# Patient Record
Sex: MALE | Race: BLACK OR AFRICAN AMERICAN | NOT HISPANIC OR LATINO | Employment: STUDENT | URBAN - METROPOLITAN AREA
[De-identification: names, ages, dates, MRNs, and addresses within clinical notes are randomized per-mention and may not be internally consistent; named-entity substitution may affect disease eponyms.]

---
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
## 2020-08-14 DIAGNOSIS — Z11.59 NEED FOR HEPATITIS C SCREENING TEST: ICD-10-CM

## 2020-10-05 ENCOUNTER — PATIENT MESSAGE (OUTPATIENT)
Dept: ADMINISTRATIVE | Facility: HOSPITAL | Age: 20
End: 2020-10-05

## 2020-11-13 ENCOUNTER — LAB VISIT (OUTPATIENT)
Dept: LAB | Facility: HOSPITAL | Age: 20
End: 2020-11-13
Payer: COMMERCIAL

## 2020-11-13 ENCOUNTER — OFFICE VISIT (OUTPATIENT)
Dept: FAMILY MEDICINE | Facility: CLINIC | Age: 20
End: 2020-11-13
Payer: COMMERCIAL

## 2020-11-13 VITALS
WEIGHT: 185.94 LBS | HEART RATE: 81 BPM | HEIGHT: 78 IN | OXYGEN SATURATION: 98 % | SYSTOLIC BLOOD PRESSURE: 120 MMHG | TEMPERATURE: 97 F | DIASTOLIC BLOOD PRESSURE: 77 MMHG | BODY MASS INDEX: 21.51 KG/M2

## 2020-11-13 DIAGNOSIS — R30.0 DYSURIA: ICD-10-CM

## 2020-11-13 DIAGNOSIS — N39.0 URINARY TRACT INFECTION WITH HEMATURIA, SITE UNSPECIFIED: Primary | ICD-10-CM

## 2020-11-13 DIAGNOSIS — R31.9 URINARY TRACT INFECTION WITH HEMATURIA, SITE UNSPECIFIED: Primary | ICD-10-CM

## 2020-11-13 LAB
BILIRUB SERPL-MCNC: NEGATIVE MG/DL
BLOOD URINE, POC: ABNORMAL
CLARITY, POC UA: ABNORMAL
COLOR, POC UA: YELLOW
GLUCOSE UR QL STRIP: NORMAL
KETONES UR QL STRIP: NEGATIVE
LEUKOCYTE ESTERASE URINE, POC: ABNORMAL
NITRITE, POC UA: NEGATIVE
PH, POC UA: 5
PROTEIN, POC: ABNORMAL
UROBILINOGEN, POC UA: NORMAL

## 2020-11-13 PROCEDURE — 86592 SYPHILIS TEST NON-TREP QUAL: CPT

## 2020-11-13 PROCEDURE — 81002 POCT URINE DIPSTICK WITHOUT MICROSCOPE: ICD-10-PCS | Mod: S$GLB,,, | Performed by: NURSE PRACTITIONER

## 2020-11-13 PROCEDURE — 36415 COLL VENOUS BLD VENIPUNCTURE: CPT | Mod: PO

## 2020-11-13 PROCEDURE — 86703 HIV-1/HIV-2 1 RESULT ANTBDY: CPT

## 2020-11-13 PROCEDURE — 99999 PR PBB SHADOW E&M-EST. PATIENT-LVL III: ICD-10-PCS | Mod: PBBFAC,,, | Performed by: NURSE PRACTITIONER

## 2020-11-13 PROCEDURE — 99214 OFFICE O/P EST MOD 30 MIN: CPT | Mod: 25,S$GLB,, | Performed by: NURSE PRACTITIONER

## 2020-11-13 PROCEDURE — 3008F PR BODY MASS INDEX (BMI) DOCUMENTED: ICD-10-PCS | Mod: CPTII,S$GLB,, | Performed by: NURSE PRACTITIONER

## 2020-11-13 PROCEDURE — 96372 THER/PROPH/DIAG INJ SC/IM: CPT | Mod: S$GLB,,, | Performed by: NURSE PRACTITIONER

## 2020-11-13 PROCEDURE — 99214 PR OFFICE/OUTPT VISIT, EST, LEVL IV, 30-39 MIN: ICD-10-PCS | Mod: 25,S$GLB,, | Performed by: NURSE PRACTITIONER

## 2020-11-13 PROCEDURE — 96372 PR INJECTION,THERAP/PROPH/DIAG2ST, IM OR SUBCUT: ICD-10-PCS | Mod: S$GLB,,, | Performed by: NURSE PRACTITIONER

## 2020-11-13 PROCEDURE — 87086 URINE CULTURE/COLONY COUNT: CPT

## 2020-11-13 PROCEDURE — 1126F PR PAIN SEVERITY QUANTIFIED, NO PAIN PRESENT: ICD-10-PCS | Mod: S$GLB,,, | Performed by: NURSE PRACTITIONER

## 2020-11-13 PROCEDURE — 99999 PR PBB SHADOW E&M-EST. PATIENT-LVL III: CPT | Mod: PBBFAC,,, | Performed by: NURSE PRACTITIONER

## 2020-11-13 PROCEDURE — 87661 TRICHOMONAS VAGINALIS AMPLIF: CPT

## 2020-11-13 PROCEDURE — 81002 URINALYSIS NONAUTO W/O SCOPE: CPT | Mod: S$GLB,,, | Performed by: NURSE PRACTITIONER

## 2020-11-13 PROCEDURE — 1126F AMNT PAIN NOTED NONE PRSNT: CPT | Mod: S$GLB,,, | Performed by: NURSE PRACTITIONER

## 2020-11-13 PROCEDURE — 3008F BODY MASS INDEX DOCD: CPT | Mod: CPTII,S$GLB,, | Performed by: NURSE PRACTITIONER

## 2020-11-13 RX ORDER — AZITHROMYCIN 1 G/1
1 POWDER, FOR SUSPENSION ORAL ONCE
Qty: 1 PACKET | Refills: 0 | Status: SHIPPED | OUTPATIENT
Start: 2020-11-13 | End: 2020-11-13

## 2020-11-13 RX ORDER — CEFTRIAXONE 250 MG/1
250 INJECTION, POWDER, FOR SOLUTION INTRAMUSCULAR; INTRAVENOUS
Status: COMPLETED | OUTPATIENT
Start: 2020-11-13 | End: 2020-11-13

## 2020-11-13 RX ADMIN — CEFTRIAXONE 250 MG: 250 INJECTION, POWDER, FOR SOLUTION INTRAMUSCULAR; INTRAVENOUS at 11:11

## 2020-11-13 NOTE — PATIENT INSTRUCTIONS
Ceftriaxone injection  What is this medicine?  CEFTRIAXONE (sef try AX one) is a cephalosporin antibiotic. It is used to treat certain kinds of bacterial infections. It will not work for colds, flu, or other viral infections.  How should I use this medicine?  This medicine is injected into a muscle or infused it into a vein. It is usually given in a medical office or clinic. If you are to give this medicine you will be taught how to inject it. Follow instructions carefully. Use your doses at regular intervals. Do not take your medicine more often than directed. Do not skip doses or stop your medicine early even if you feel better. Do not stop taking except on your doctor's advice.  Talk to your pediatrician regarding the use of this medicine in children. Special care may be needed.  What side effects may I notice from receiving this medicine?  Side effects that you should report to your doctor or health care professional as soon as possible:  · allergic reactions like skin rash, itching or hives, swelling of the face, lips, or tongue  · breathing problems  · fever, chills  · irregular heartbeat  · pain when passing urine  · seizures  · stomach pain, cramps  · unusual bleeding, bruising  · unusually weak or tired  Side effects that usually do not require medical attention (report to your doctor or health care professional if they continue or are bothersome):  · diarrhea  · dizzy, drowsy  · headache  · nausea, vomiting  · pain, swelling, irritation where injected  · stomach upset  · sweating  What may interact with this medicine?  Do not take this medicine with any of the following medications:  · intravenous calcium  This medicine may also interact with the following medications:  · birth control pills  What if I miss a dose?  If you miss a dose, take it as soon as you can. If it is almost time for your next dose, take only that dose. Do not take double or extra doses.  Where should I keep my medicine?  Keep out of  the reach of children.  Store at room temperature below 25 degrees C (77 degrees F). Protect from light. Throw away any unused vials after the expiration date.  What should I tell my health care provider before I take this medicine?  They need to know if you have any of these conditions:  · any chronic illness  · bowel disease, like colitis  · both kidney and liver disease  · high bilirubin level in  patients  · an unusual or allergic reaction to ceftriaxone, other cephalosporin or penicillin antibiotics, foods, dyes, or preservatives  · pregnant or trying to get pregnant  · breast-feeding  What should I watch for while using this medicine?  Tell your doctor or health care professional if your symptoms do not improve or if they get worse.  Do not treat diarrhea with over the counter products. Contact your doctor if you have diarrhea that lasts more than 2 days or if it is severe and watery.  If you are being treated for a sexually transmitted disease, avoid sexual contact until you have finished your treatment. Having sex can infect your sexual partner.  Calcium may bind to this medicine and cause lung or kidney problems. Avoid calcium products while taking this medicine and for 48 hours after taking the last dose of this medicine.  NOTE:This sheet is a summary. It may not cover all possible information. If you have questions about this medicine, talk to your doctor, pharmacist, or health care provider. Copyright© 2017 [employer]        Azithromycin tablets  What is this medicine?  AZITHROMYCIN (az ith gage MYE sin) is a macrolide antibiotic. It is used to treat or prevent certain kinds of bacterial infections. It will not work for colds, flu, or other viral infections.  How should I use this medicine?  Take this medicine by mouth with a full glass of water. Follow the directions on the prescription label. The tablets can be taken with food or on an empty stomach. If the medicine upsets your stomach, take  it with food. Take your medicine at regular intervals. Do not take your medicine more often than directed. Take all of your medicine as directed even if you think your are better. Do not skip doses or stop your medicine early. Talk to your pediatrician regarding the use of this medicine in children. Special care may be needed.  What side effects may I notice from receiving this medicine?  Side effects that you should report to your doctor or health care professional as soon as possible:  · allergic reactions like skin rash, itching or hives, swelling of the face, lips, or tongue  · confusion, nightmares or hallucinations  · dark urine  · difficulty breathing  · hearing loss  · irregular heartbeat or chest pain  · pain or difficulty passing urine  · redness, blistering, peeling or loosening of the skin, including inside the mouth  · white patches or sores in the mouth  · yellowing of the eyes or skin  Side effects that usually do not require medical attention (report to your doctor or health care professional if they continue or are bothersome):  · diarrhea  · dizziness, drowsiness  · headache  · stomach upset or vomiting  · tooth discoloration  · vaginal irritation  What may interact with this medicine?  Do not take this medicine with any of the following medications:  · lincomycin  This medicine may also interact with the following medications:  · amiodarone  · antacids  · birth control pills  · cyclosporine  · digoxin  · magnesium  · nelfinavir  · phenytoin  · warfarin  What if I miss a dose?  If you miss a dose, take it as soon as you can. If it is almost time for your next dose, take only that dose. Do not take double or extra doses.  Where should I keep my medicine?  Keep out of the reach of children.  Store at room temperature between 15 and 30 degrees C (59 and 86 degrees F). Throw away any unused medicine after the expiration date.  What should I tell my health care provider before I take this  medicine?  They need to know if you have any of these conditions:  · kidney disease  · liver disease  · irregular heartbeat or heart disease  · an unusual or allergic reaction to azithromycin, erythromycin, other macrolide antibiotics, foods, dyes, or preservatives  · pregnant or trying to get pregnant  · breast-feeding  What should I watch for while using this medicine?  Tell your doctor or health care professional if your symptoms do not improve.  Do not treat diarrhea with over the counter products. Contact your doctor if you have diarrhea that lasts more than 2 days or if it is severe and watery.  This medicine can make you more sensitive to the sun. Keep out of the sun. If you cannot avoid being in the sun, wear protective clothing and use sunscreen. Do not use sun lamps or tanning beds/booths.  NOTE:This sheet is a summary. It may not cover all possible information. If you have questions about this medicine, talk to your doctor, pharmacist, or health care provider. Copyright© 2017 [employer]        Teens: About STDs  If youre having sex, or thinking about having sex, you need to know about sexually transmitted diseases or STDs. STDs can cause serious health problems. And not all can be cured. But there are ways to protect yourself and others.    How do you catch an STD?  Its simple. You catch an STD by having sex with an infected partner. Some STDs spread when body fluids (semen, vaginal fluids, saliva, blood) get passed during sex. Others spread by touching infected areas. It doesnt matter what kind of sex it is. You can catch an STD if sex involves the mouth (oral sex), vagina (vaginal sex), or anus (anal sex). The risks of catching an STD are even higher if:  · You have more than 1 sex partner   · Your partner has sex with other people  · You dont use latex condoms  What can you do?  To prevent problems now--and in the future--decide if its the right time to have sex.  Its OK to wait. Not having  sex is the only sure way to prevent STDs.  But if you are having sex...  · Always use a latex condom during sex. Why latex? When used correctly and constantly during sexual intercourse, latex condoms are very effective in decreasing the transmission of HIV and other viruses compared to other types of condoms (for example, those made of lambskin or natural membranes).  · Limit how many sex partners you have. The more people you have sex with, the greater your risk of catching an STD.  · Get checked if you think you have an STD. Then you can be treated if you do have an STD.  Date Last Reviewed: 1/1/2017  © 4346-0999 [employer]. [address] [place] [address], [place] [address]. All rights reserved. This information is not intended as a substitute for professional medical care. Always follow your healthcare professional's instructions.

## 2020-11-13 NOTE — PROGRESS NOTES
______________________________________________________________________    Chief Complaint  Chief Complaint   Patient presents with    burning urination       HPI  João Steele is a 20 y.o. male with multiple medical diagnoses as listed in the medical history and problem list that presents for burning on urination.  Pt is known to me with his last appointment Visit date not found.      Burning on urination x 3-4 days. Denies penile discharge. Sexual active. More than one sex partner. Last time he's had intercourse was a week. Has never had STI testing. Denies fever, chills, body aches or night sweats. Denies abdominal, pelvic or flank pain. Denies scrotal swelling, tenderness, redness or pain.       PAST MEDICAL HISTORY:  Past Medical History:   Diagnosis Date    Avulsion fracture of right hip 7/8/2016    Migraines     Seasonal allergic rhinitis        PAST SURGICAL HISTORY:  Past Surgical History:   Procedure Laterality Date    LUNG SURGERY  6mo, 3yo    2 cleft cysts removed    TONSILLECTOMY, ADENOIDECTOMY  3yo    TYMPANOSTOMY TUBE PLACEMENT         SOCIAL HISTORY:  Social History     Socioeconomic History    Marital status: Single     Spouse name: Not on file    Number of children: Not on file    Years of education: Not on file    Highest education level: Not on file   Occupational History    Not on file   Social Needs    Financial resource strain: Not on file    Food insecurity     Worry: Not on file     Inability: Not on file    Transportation needs     Medical: Not on file     Non-medical: Not on file   Tobacco Use    Smoking status: Never Smoker    Smokeless tobacco: Never Used   Substance and Sexual Activity    Alcohol use: No    Drug use: No    Sexual activity: Never   Lifestyle    Physical activity     Days per week: Not on file     Minutes per session: Not on file    Stress: Not on file   Relationships    Social connections     Talks on phone: Not on file     Gets together: Not on file      Attends Pentecostal service: Not on file     Active member of club or organization: Not on file     Attends meetings of clubs or organizations: Not on file     Relationship status: Not on file   Other Topics Concern    Not on file   Social History Narrative    Not on file       FAMILY HISTORY:  Family History   Problem Relation Age of Onset    Heart disease Mother     Hypertension Father     Hypertension Maternal Grandmother     Cancer Maternal Grandmother     Hypertension Maternal Grandfather     Hypertension Paternal Grandfather        ALLERGIES AND MEDICATIONS: updated and reviewed.  Review of patient's allergies indicates:  No Known Allergies  Current Outpatient Medications   Medication Sig Dispense Refill    ibuprofen (ADVIL,MOTRIN) 800 MG tablet Take 1 tablet (800 mg total) by mouth every 8 (eight) hours as needed for Pain. 30 tablet 1    azithromycin (ZITHROMAX) 1 gram Pack Take 1 g by mouth once. for 1 dose 1 packet 0    sumatriptan (IMITREX) 50 MG tablet Take 1 tablet (50 mg total) by mouth daily as needed (can repeat in 1-2 hours if needed for headache relief). 30 tablet 1     No current facility-administered medications for this visit.          ROS  Review of Systems   Constitutional: Negative for appetite change, chills, fatigue and fever.   HENT: Negative for congestion, ear pain, postnasal drip, rhinorrhea, sneezing, sore throat and tinnitus.    Respiratory: Negative for cough and shortness of breath.    Cardiovascular: Negative for chest pain and palpitations.   Gastrointestinal: Negative for abdominal pain, constipation, diarrhea, nausea and vomiting.   Genitourinary: Positive for dysuria, frequency and urgency. Negative for difficulty urinating.   Musculoskeletal: Negative for arthralgias.   Neurological: Negative for headaches.           Physical Exam  Vitals:    11/13/20 1118   BP: 120/77   Pulse: 81   Temp: 97.1 °F (36.2 °C)   TempSrc: Oral   SpO2: 98%   Weight: 84.4 kg (185 lb 15.3  "oz)   Height: 6' 6" (1.981 m)    Body mass index is 21.49 kg/m².  Weight: 84.4 kg (185 lb 15.3 oz)   Height: 6' 6" (198.1 cm)   Physical Exam  Constitutional:       General: He is not in acute distress.     Appearance: Normal appearance. He is well-developed.   HENT:      Head: Normocephalic and atraumatic.      Right Ear: External ear normal.      Left Ear: External ear normal.      Nose: Nose normal.      Mouth/Throat:      Pharynx: No oropharyngeal exudate.   Eyes:      Pupils: Pupils are equal, round, and reactive to light.   Neck:      Musculoskeletal: Neck supple.   Cardiovascular:      Rate and Rhythm: Normal rate and regular rhythm.      Heart sounds: No murmur. No friction rub. No gallop.    Pulmonary:      Effort: Pulmonary effort is normal. No respiratory distress.      Breath sounds: Normal breath sounds. No wheezing.   Abdominal:      General: Bowel sounds are normal. There is no distension.      Palpations: Abdomen is soft. There is no mass.      Tenderness: There is no abdominal tenderness.      Hernia: No hernia is present.   Lymphadenopathy:      Cervical: No cervical adenopathy.   Skin:     General: Skin is warm and dry.      Capillary Refill: Capillary refill takes less than 2 seconds.      Coloration: Skin is not jaundiced.   Neurological:      General: No focal deficit present.      Mental Status: He is alert and oriented to person, place, and time. Mental status is at baseline.   Psychiatric:         Mood and Affect: Mood normal.           Health Maintenance       Date Due Completion Date    Hepatitis C Screening 2000 ---    Lipid Panel 2000 ---    HIV Screening 11/07/2015 ---    Influenza Vaccine (1) 08/01/2020 10/30/2015    TETANUS VACCINE 01/06/2022 1/6/2012            Assessment & Plan  Problem List Items Addressed This Visit     None      Visit Diagnoses     Dysuria    -  Primary  -Patient treated empirically for Gonorrhea and Chlamydia given his sexual Hx and symptoms. "   -Patient's Urine dipstick is positive for Leukocytes, Protein and Hematuria  -Will treat with longer stent of antibiotics pending lab results    -Patient educated on  possible side effects and adverse effects of Rocephin and Azithromycin with handout for reinforcement.  -Patient educated on ER precautions    -Patient verbalized understanding of all education discussed in clinic      Relevant Medications    azithromycin (ZITHROMAX) 1 gram Pack    cefTRIAXone injection 250 mg (Start on 11/13/2020 12:00 PM)    Other Relevant Orders    RPR    Trichomonas vaginalis, RNA, Qual, Urine    POCT URINE DIPSTICK WITHOUT MICROSCOPE (Completed)    Urine culture    HIV 1/2 Ag/Ab (4th Gen)    Urinary tract infection with hematuria, site unspecified      -See Dysuria     Relevant Medications    azithromycin (ZITHROMAX) 1 gram Pack    cefTRIAXone injection 250 mg (Start on 11/13/2020 12:00 PM)            Health Maintenance revieweDictation #1  MRN:[id number]  CSN:[id number]      Follow-up: Follow up if symptoms worsen or fail to improve.

## 2020-11-14 LAB
BACTERIA UR CULT: NO GROWTH
RPR SER QL: NORMAL

## 2020-11-16 LAB — HIV 1+2 AB+HIV1 P24 AG SERPL QL IA: NEGATIVE

## 2020-11-16 NOTE — PROGRESS NOTES
Good afternoon Team    1. Please notify patient that his HIV and Syphilis screening are both negative  2. Please call or schedule an appt if reported issues do not resolve.     Thanks     Paul Quintero NP

## 2020-11-18 ENCOUNTER — TELEPHONE (OUTPATIENT)
Dept: FAMILY MEDICINE | Facility: CLINIC | Age: 20
End: 2020-11-18

## 2020-11-18 LAB
T VAGINALIS RRNA SPEC QL NAA+PROBE: NOT DETECTED
TRICHOMONAS VAGINALIS RNA, QUAL, SOURCE: NORMAL

## 2020-11-18 NOTE — TELEPHONE ENCOUNTER
Left message on answering machine to return call to clinic. If patient calls back just give him labs results from. MATEUS Quintero . Thanks in Advance

## 2020-11-18 NOTE — TELEPHONE ENCOUNTER
----- Message from Paul Quintero NP sent at 11/18/2020  3:51 PM CST -----  Good afternoon Team    1. Please notify patient that his STI screenings were all negative  2. Advise him to continue safe sex practices     Thanks     Paul Quintero NP

## 2021-05-26 ENCOUNTER — TELEPHONE (OUTPATIENT)
Dept: FAMILY MEDICINE | Facility: CLINIC | Age: 21
End: 2021-05-26

## 2021-08-12 ENCOUNTER — PATIENT MESSAGE (OUTPATIENT)
Dept: FAMILY MEDICINE | Facility: CLINIC | Age: 21
End: 2021-08-12

## 2021-08-12 DIAGNOSIS — F12.90 MARIJUANA USE: Primary | ICD-10-CM

## 2021-12-27 ENCOUNTER — HOSPITAL ENCOUNTER (EMERGENCY)
Facility: HOSPITAL | Age: 21
Discharge: HOME | End: 2021-12-27
Attending: EMERGENCY MEDICINE
Payer: COMMERCIAL

## 2021-12-27 VITALS
OXYGEN SATURATION: 99 % | DIASTOLIC BLOOD PRESSURE: 69 MMHG | HEIGHT: 78 IN | RESPIRATION RATE: 18 BRPM | HEART RATE: 93 BPM | SYSTOLIC BLOOD PRESSURE: 105 MMHG | BODY MASS INDEX: 23.72 KG/M2 | TEMPERATURE: 101 F | WEIGHT: 205 LBS

## 2021-12-27 DIAGNOSIS — U07.1 COVID-19: Primary | ICD-10-CM

## 2021-12-27 LAB
CTP QC/QA: YES
SARS-COV-2 RDRP RESP QL NAA+PROBE: POSITIVE

## 2021-12-27 PROCEDURE — 99284 EMERGENCY DEPT VISIT MOD MDM: CPT | Mod: 25,ER

## 2021-12-27 PROCEDURE — U0002 COVID-19 LAB TEST NON-CDC: HCPCS | Mod: ER | Performed by: EMERGENCY MEDICINE

## 2021-12-27 PROCEDURE — 25000003 PHARM REV CODE 250: Mod: ER | Performed by: EMERGENCY MEDICINE

## 2021-12-27 RX ORDER — ACETAMINOPHEN 500 MG
1000 TABLET ORAL
Status: COMPLETED | OUTPATIENT
Start: 2021-12-27 | End: 2021-12-27

## 2021-12-27 RX ORDER — ACETAMINOPHEN 500 MG
500 TABLET ORAL EVERY 4 HOURS PRN
Qty: 20 TABLET | Refills: 0 | Status: SHIPPED | OUTPATIENT
Start: 2021-12-27 | End: 2022-01-01

## 2021-12-27 RX ORDER — BENZONATATE 100 MG/1
100 CAPSULE ORAL 3 TIMES DAILY PRN
Qty: 20 CAPSULE | Refills: 0 | Status: SHIPPED | OUTPATIENT
Start: 2021-12-27 | End: 2022-01-03

## 2021-12-27 RX ORDER — IBUPROFEN 400 MG/1
400 TABLET ORAL
Status: DISCONTINUED | OUTPATIENT
Start: 2021-12-27 | End: 2021-12-27

## 2021-12-27 RX ORDER — CETIRIZINE HYDROCHLORIDE 10 MG/1
10 TABLET ORAL DAILY
Qty: 14 TABLET | Refills: 0 | Status: SHIPPED | OUTPATIENT
Start: 2021-12-27 | End: 2022-01-10

## 2021-12-27 RX ORDER — FLUTICASONE PROPIONATE 50 MCG
1 SPRAY, SUSPENSION (ML) NASAL 2 TIMES DAILY PRN
Qty: 15 G | Refills: 0 | Status: SHIPPED | OUTPATIENT
Start: 2021-12-27 | End: 2022-01-26

## 2021-12-27 RX ORDER — IBUPROFEN 400 MG/1
800 TABLET ORAL
Status: COMPLETED | OUTPATIENT
Start: 2021-12-27 | End: 2021-12-27

## 2021-12-27 RX ORDER — ONDANSETRON 4 MG/1
4 TABLET, ORALLY DISINTEGRATING ORAL EVERY 6 HOURS PRN
Qty: 15 TABLET | Refills: 0 | Status: SHIPPED | OUTPATIENT
Start: 2021-12-27 | End: 2022-01-01

## 2021-12-27 RX ORDER — IBUPROFEN 600 MG/1
600 TABLET ORAL EVERY 6 HOURS PRN
Qty: 20 TABLET | Refills: 0 | Status: SHIPPED | OUTPATIENT
Start: 2021-12-27 | End: 2022-01-01

## 2021-12-27 RX ADMIN — IBUPROFEN 800 MG: 400 TABLET ORAL at 07:12

## 2021-12-27 RX ADMIN — ACETAMINOPHEN 1000 MG: 500 TABLET ORAL at 07:12

## 2021-12-27 NOTE — Clinical Note
"João"Tash Steele was seen and treated in our emergency department on 12/27/2021.     COVID-19 is present in our communities across the state. There is limited testing for COVID at this time, so not all patients can be tested. In this situation, your employee meets the following criteria:    João Steele has met the criteria for COVID-19 testing and has a POSITIVE result. He can return to work once they are asymptomatic for 72 hours without the use of fever reducing medications AND at least ten days from the first positive result.     If you have any questions or concerns, or if I can be of further assistance, please do not hesitate to contact me.    Sincerely,             Sara Brown PA-C"

## 2021-12-28 NOTE — ED PROVIDER NOTES
Encounter Date: 12/27/2021    SCRIBE #1 NOTE: I, Jairo Dickey, am scribing for, and in the presence of,  NAIMA Mcguire. I have scribed the following portions of the note - Other sections scribed: HPI, ROS, PE.       History     Chief Complaint   Patient presents with    COVID-19 Concerns     FEVER, CHILLS, BODY ACHES, NAUSEA X 2 DAYS     João Steele is a 21 y.o. male who presents to the ED for evaluation of headache onset yesterday. Associated fever, myalgia, and nausea. Denies vomiting, diarrhea, or other associated symptoms. No alleviating/aggravating factors. Notes pain is 8/10. Denies taking any medications for symptoms. Reports recent known sick contact.     The history is provided by the patient. No  was used.     Review of patient's allergies indicates:  No Known Allergies  Past Medical History:   Diagnosis Date    Avulsion fracture of right hip 7/8/2016    Migraines     Seasonal allergic rhinitis      Past Surgical History:   Procedure Laterality Date    LUNG SURGERY  6mo, 3yo    2 cleft cysts removed    TONSILLECTOMY, ADENOIDECTOMY  5yo    TYMPANOSTOMY TUBE PLACEMENT       Family History   Problem Relation Age of Onset    Heart disease Mother     Hypertension Father     Hypertension Maternal Grandmother     Cancer Maternal Grandmother     Hypertension Maternal Grandfather     Hypertension Paternal Grandfather      Social History     Tobacco Use    Smoking status: Never Smoker    Smokeless tobacco: Never Used   Substance Use Topics    Alcohol use: No    Drug use: No     Review of Systems   Constitutional: Positive for fever. Negative for chills and fatigue.   HENT: Negative for congestion, ear pain, rhinorrhea and sore throat.    Eyes: Negative for pain, discharge and redness.   Respiratory: Negative for cough and shortness of breath.    Cardiovascular: Negative for chest pain.   Gastrointestinal: Positive for nausea. Negative for abdominal pain, anal bleeding,  blood in stool, constipation, diarrhea, rectal pain and vomiting.   Genitourinary: Negative for dysuria and hematuria.   Musculoskeletal: Positive for myalgias. Negative for back pain, neck pain and neck stiffness.   Skin: Negative for rash.   Allergic/Immunologic: Negative for food allergies.   Neurological: Positive for headaches. Negative for dizziness, weakness, light-headedness and numbness.   Psychiatric/Behavioral: Negative for confusion.       Physical Exam     Initial Vitals [12/27/21 1923]   BP Pulse Resp Temp SpO2   105/69 93 18 (!) 101.8 °F (38.8 °C) 99 %      MAP       --         Physical Exam    Vitals reviewed.  Constitutional: He appears well-developed and well-nourished. He is not diaphoretic. He is cooperative. No distress.   HENT:   Head: Normocephalic and atraumatic.   Right Ear: External ear normal.   Left Ear: External ear normal.   Nose: Nose normal.   Eyes: Conjunctivae and EOM are normal. Pupils are equal, round, and reactive to light.   Neck: Neck supple.   Normal range of motion.  Cardiovascular: Normal rate.   Pulmonary/Chest: No respiratory distress. He has no decreased breath sounds.   SpO2 at 99% on room air with ambulation   Abdominal: He exhibits no distension.   Musculoskeletal:         General: Normal range of motion.      Cervical back: Normal range of motion and neck supple.     Neurological: He is alert and oriented to person, place, and time.   Skin: Skin is warm and dry.   Psychiatric: He has a normal mood and affect. Thought content normal.         ED Course   Procedures  Labs Reviewed   SARS-COV-2 RDRP GENE - Abnormal; Notable for the following components:       Result Value    POC Rapid COVID Positive (*)     All other components within normal limits    Narrative:     This test utilizes isothermal nucleic acid amplification   technology to detect the SARS-CoV-2 RdRp nucleic acid segment.   The analytical sensitivity (limit of detection) is 125 genome   equivalents/mL.   A  POSITIVE result implies infection with the SARS-CoV-2 virus;   the patient is presumed to be contagious.     A NEGATIVE result means that SARS-CoV-2 nucleic acids are not   present above the limit of detection. A NEGATIVE result should be   treated as presumptive. It does not rule out the possibility of   COVID-19 and should not be the sole basis for treatment decisions.   If COVID-19 is strongly suspected based on clinical and exposure   history, re-testing using an alternate molecular assay should be   considered.   This test is only for use under the Food and Drug   Administration s Emergency Use Authorization (EUA).   Commercial kits are provided by [employer].   Performance characteristics of the EUA have been independently   verified by [facility] Department of   Pathology and Laboratory Medicine.   _________________________________________________________________   The authorized Fact Sheet for Healthcare Providers and the authorized Fact   Sheet for Patients of the ID NOW COVID-19 are available on the FDA   website:     https://www.fda.gov/media/957599/download  https://www.fda.gov/media/620763/download                Imaging Results    None          Medications   acetaminophen tablet 1,000 mg (1,000 mg Oral Given 12/27/21 1937)   ibuprofen tablet 800 mg (800 mg Oral Given 12/27/21 1937)     Medical Decision Making:   History:   Old Medical Records: I decided to obtain old medical records.  Clinical Tests:   Lab Tests: Ordered and Reviewed  ED Management:  21 yr old patient presenting with constellation of symptoms most c/w COVID 19 with a positive COVID 19 test.     Also considered but less likely:  PTA/RPA: no hot potato voice   Esophageal rupture: No history of dysphagia  Unlikely deep space infection/Miltons  Low suspicion for CNS infection or bacterial sinusitis given exam and history.         No respiratory distress, otherwise relatively well appearing and nontoxic.  patient in no  acute distress. Return precautions given, patient understands and agrees with plan. All questions answered.  Instructed to follow up with PCP. There is currently no accepted treatment except conservative measures and respiratory support if appropriate.  This patient will be discharged home with strict return precautions if worsening respiratory status.  Patient was made aware other resource limitations and the fact that they could have worsening symptoms.  Patient was informed to quarantine themselves for per guidelines.               Scribe Attestation:   Scribe #1: I performed the above scribed service and the documentation accurately describes the services I performed. I attest to the accuracy of the note.               Scribe attestation: I, MIREYA Lau , personally performed the services described in this documentation.  All medical record entries made by the scribe were at my direction and in my presence.  I have reviewed the chart and agree that the record reflects my personal performance and is accurate and complete.    Clinical Impression:   Final diagnoses:  [U07.1] COVID-19 (Primary)          ED Disposition Condition    Discharge Stable        ED Prescriptions     Medication Sig Dispense Start Date End Date Auth. Provider    ibuprofen (ADVIL,MOTRIN) 600 MG tablet Take 1 tablet (600 mg total) by mouth every 6 (six) hours as needed for Pain. 20 tablet 12/27/2021 1/1/2022 Sara Brown PA-C    acetaminophen (TYLENOL) 500 MG tablet Take 1 tablet (500 mg total) by mouth every 4 (four) hours as needed. 20 tablet 12/27/2021 1/1/2022 Sara Brown PA-C    fluticasone propionate (FLONASE) 50 mcg/actuation nasal spray 1 spray (50 mcg total) by Each Nostril route 2 (two) times daily as needed for Rhinitis or Allergies. 15 g 12/27/2021 1/26/2022 Sara Brown PA-C    cetirizine (ZYRTEC) 10 MG tablet Take 1 tablet (10 mg total) by mouth once daily. for 14 days 14 tablet 12/27/2021  1/10/2022 Sara Brown PA-C    benzonatate (TESSALON) 100 MG capsule Take 1 capsule (100 mg total) by mouth 3 (three) times daily as needed for Cough. 20 capsule 12/27/2021 1/3/2022 Sara Brown PA-C    ondansetron (ZOFRAN-ODT) 4 MG TbDL Take 1 tablet (4 mg total) by mouth every 6 (six) hours as needed (for nausea). 15 tablet 12/27/2021 1/1/2022 Sara Brown PA-C        Follow-up Information     Follow up With Specialties Details Why Contact Info    Blake Garcia MD Family Medicine Schedule an appointment as soon as possible for a visit in 2 days for follow up [address] [facility] [address]  [phone]      [facility] ED Emergency Medicine Go to  As needed, If symptoms worsen [address] [facility] [address] [phone]           Sara Brown PA-C  12/29/21 [id number]

## 2021-12-28 NOTE — DISCHARGE INSTRUCTIONS

## 2024-12-08 ENCOUNTER — OFFICE VISIT (OUTPATIENT)
Dept: URGENT CARE | Facility: CLINIC | Age: 24
End: 2024-12-08
Payer: COMMERCIAL

## 2024-12-08 VITALS
WEIGHT: 225 LBS | SYSTOLIC BLOOD PRESSURE: 126 MMHG | HEART RATE: 72 BPM | RESPIRATION RATE: 18 BRPM | HEIGHT: 78 IN | DIASTOLIC BLOOD PRESSURE: 89 MMHG | TEMPERATURE: 98 F | BODY MASS INDEX: 26.03 KG/M2 | OXYGEN SATURATION: 97 %

## 2024-12-08 DIAGNOSIS — R51.9 NONINTRACTABLE HEADACHE, UNSPECIFIED CHRONICITY PATTERN, UNSPECIFIED HEADACHE TYPE: Primary | ICD-10-CM

## 2024-12-08 PROCEDURE — 99214 OFFICE O/P EST MOD 30 MIN: CPT | Mod: S$GLB,,, | Performed by: NURSE PRACTITIONER

## 2024-12-08 RX ORDER — PROMETHAZINE HYDROCHLORIDE 25 MG/1
25 TABLET ORAL EVERY 6 HOURS PRN
Qty: 12 TABLET | Refills: 0 | Status: SHIPPED | OUTPATIENT
Start: 2024-12-08 | End: 2024-12-11

## 2024-12-08 RX ORDER — IBUPROFEN 800 MG/1
800 TABLET ORAL EVERY 8 HOURS PRN
Qty: 9 TABLET | Refills: 0 | Status: SHIPPED | OUTPATIENT
Start: 2024-12-08 | End: 2024-12-08

## 2024-12-08 RX ORDER — PREDNISONE 20 MG/1
20 TABLET ORAL DAILY
Qty: 5 TABLET | Refills: 0 | Status: SHIPPED | OUTPATIENT
Start: 2024-12-08 | End: 2024-12-08

## 2024-12-08 RX ORDER — PREDNISONE 20 MG/1
20 TABLET ORAL DAILY
Qty: 5 TABLET | Refills: 0 | Status: SHIPPED | OUTPATIENT
Start: 2024-12-08 | End: 2024-12-13

## 2024-12-08 RX ORDER — PROMETHAZINE HYDROCHLORIDE 25 MG/1
25 TABLET ORAL EVERY 6 HOURS PRN
Qty: 12 TABLET | Refills: 0 | Status: SHIPPED | OUTPATIENT
Start: 2024-12-08 | End: 2024-12-08

## 2024-12-08 RX ORDER — IBUPROFEN 800 MG/1
800 TABLET ORAL EVERY 8 HOURS PRN
Qty: 9 TABLET | Refills: 0 | Status: SHIPPED | OUTPATIENT
Start: 2024-12-08 | End: 2024-12-11

## 2024-12-08 NOTE — LETTER
December 8, 2024      [facility]  [address]  [facility] [address]  Phone: [phone]       Patient: João Steele   YOB: 2000  Date of Visit: 12/08/2024    To Whom It May Concern:    Edwige Steele  was at [facility] on 12/08/2024. Please excuse from work: 12/08/24 - 12/10/24. The patient may return to work/school on 12/11/24 restrictions. If you have any questions or concerns, or if I can be of further assistance, please do not hesitate to contact me.    Sincerely,      Alfonso Sarabia NP

## 2024-12-08 NOTE — PROGRESS NOTES
"Subjective:      Patient ID: João Steele is a 24 y.o. male.    Vitals:  height is 6' 7" (2.007 m) and weight is 102.1 kg (225 lb). His oral temperature is 97.6 °F (36.4 °C). His blood pressure is 126/89 and his pulse is 72. His respiration is 18 and oxygen saturation is 97%.     Chief Complaint: Headache    25yo male with c/o headaches for the past two days. He reports history of migraines. He reports left front headache. He denies any other symptoms or complaints.    Headache   This is a new problem. The current episode started yesterday. The problem occurs constantly. The problem has been unchanged. The pain does not radiate. The pain quality is similar to prior headaches. The quality of the pain is described as aching, pulsating and boring. The pain is at a severity of 8/10. The pain is mild. He has tried NSAIDs for the symptoms. The treatment provided mild relief. His past medical history is significant for migraine headaches.       Neurological:  Positive for headaches and history of migraines.      Objective:     Physical Exam   Constitutional: He is oriented to person, place, and time.  Non-toxic appearance. He does not appear ill. No distress. normal  HENT:   Head: Normocephalic and atraumatic.   Ears:   Right Ear: Tympanic membrane, external ear and ear canal normal.   Left Ear: Tympanic membrane, external ear and ear canal normal.   Nose: Nose normal.   Mouth/Throat: Mucous membranes are moist. No posterior oropharyngeal erythema. Oropharynx is clear.   Eyes: Conjunctivae are normal. Pupils are equal, round, and reactive to light. Extraocular movement intact   Neck: Neck supple.   Cardiovascular: Normal rate, regular rhythm, normal heart sounds and normal pulses.   Pulmonary/Chest: Effort normal and breath sounds normal.   Abdominal: Normal appearance.   Musculoskeletal: Normal range of motion.         General: Normal range of motion.   Neurological: no focal deficit. He is alert and oriented to person, " place, and time. He displays no weakness. No cranial nerve deficit or sensory deficit.   Skin: Skin is warm, dry and not diaphoretic. Capillary refill takes less than 2 seconds.   Psychiatric: His behavior is normal. Mood, judgment and thought content normal.   Vitals reviewed.      Assessment:     1. Nonintractable headache, unspecified chronicity pattern, unspecified headache type        Plan:       Nonintractable headache, unspecified chronicity pattern, unspecified headache type  -     ibuprofen (ADVIL,MOTRIN) 800 MG tablet; Take 1 tablet (800 mg total) by mouth every 8 (eight) hours as needed for Pain.  Dispense: 9 tablet; Refill: 0  -     promethazine (PHENERGAN) 25 MG tablet; Take 1 tablet (25 mg total) by mouth every 6 (six) hours as needed for Nausea.  Dispense: 12 tablet; Refill: 0  -     predniSONE (DELTASONE) 20 MG tablet; Take 1 tablet (20 mg total) by mouth once daily. for 5 days  Dispense: 5 tablet; Refill: 0      INSTRUCTIONS  Meds as prescribed. Follow up as advised.